# Patient Record
Sex: FEMALE | Race: WHITE
[De-identification: names, ages, dates, MRNs, and addresses within clinical notes are randomized per-mention and may not be internally consistent; named-entity substitution may affect disease eponyms.]

---

## 2022-10-28 ENCOUNTER — HOSPITAL ENCOUNTER (EMERGENCY)
Dept: HOSPITAL 75 - ER FS | Age: 42
Discharge: HOME | End: 2022-10-28
Payer: COMMERCIAL

## 2022-10-28 VITALS — DIASTOLIC BLOOD PRESSURE: 76 MMHG | SYSTOLIC BLOOD PRESSURE: 124 MMHG

## 2022-10-28 VITALS — WEIGHT: 139.99 LBS | HEIGHT: 67.99 IN | BODY MASS INDEX: 21.22 KG/M2

## 2022-10-28 DIAGNOSIS — Z28.310: ICD-10-CM

## 2022-10-28 DIAGNOSIS — R19.7: Primary | ICD-10-CM

## 2022-10-28 LAB
ALBUMIN SERPL-MCNC: 4.6 GM/DL (ref 3.2–4.5)
ALP SERPL-CCNC: 69 U/L (ref 40–136)
ALT SERPL-CCNC: 13 U/L (ref 0–55)
AMYLASE SERPL-CCNC: 51 U/L (ref 25–125)
APTT PPP: YELLOW S
BACTERIA #/AREA URNS HPF: NEGATIVE /HPF
BASOPHILS # BLD AUTO: 0.1 10^3/UL (ref 0–0.1)
BASOPHILS NFR BLD AUTO: 1 % (ref 0–10)
BILIRUB SERPL-MCNC: 0.2 MG/DL (ref 0.1–1)
BILIRUB UR QL STRIP: NEGATIVE
BUN/CREAT SERPL: 21
CALCIUM SERPL-MCNC: 9.1 MG/DL (ref 8.5–10.1)
CHLORIDE SERPL-SCNC: 107 MMOL/L (ref 98–107)
CO2 SERPL-SCNC: 25 MMOL/L (ref 21–32)
CREAT SERPL-MCNC: 0.66 MG/DL (ref 0.6–1.3)
EOSINOPHIL # BLD AUTO: 0.1 10^3/UL (ref 0–0.3)
EOSINOPHIL NFR BLD AUTO: 2 % (ref 0–10)
FIBRINOGEN PPP-MCNC: (no result) MG/DL
GFR SERPLBLD BASED ON 1.73 SQ M-ARVRAT: 112 ML/MIN
GLUCOSE SERPL-MCNC: 101 MG/DL (ref 70–105)
GLUCOSE UR STRIP-MCNC: NEGATIVE MG/DL
HCT VFR BLD CALC: 43 % (ref 35–52)
HGB BLD-MCNC: 14.7 G/DL (ref 11.5–16)
KETONES UR QL STRIP: (no result)
LEUKOCYTE ESTERASE UR QL STRIP: NEGATIVE
LIPASE SERPL-CCNC: 32 U/L (ref 8–78)
LYMPHOCYTES # BLD AUTO: 1 10^3/UL (ref 1–4)
LYMPHOCYTES NFR BLD AUTO: 13 % (ref 12–44)
MANUAL DIFFERENTIAL PERFORMED BLD QL: NO
MCH RBC QN AUTO: 30 PG (ref 25–34)
MCHC RBC AUTO-ENTMCNC: 34 G/DL (ref 32–36)
MCV RBC AUTO: 89 FL (ref 80–99)
MONOCYTES # BLD AUTO: 0.3 10^3/UL (ref 0–1)
MONOCYTES NFR BLD AUTO: 4 % (ref 0–12)
NEUTROPHILS # BLD AUTO: 6 10^3/UL (ref 1.8–7.8)
NEUTROPHILS NFR BLD AUTO: 80 % (ref 42–75)
NITRITE UR QL STRIP: NEGATIVE
PH UR STRIP: 5 [PH] (ref 5–9)
PLATELET # BLD: 379 10^3/UL (ref 130–400)
PMV BLD AUTO: 8.6 FL (ref 9–12.2)
POTASSIUM SERPL-SCNC: 4.5 MMOL/L (ref 3.6–5)
PROT SERPL-MCNC: 7.6 GM/DL (ref 6.4–8.2)
PROT UR QL STRIP: NEGATIVE
RBC #/AREA URNS HPF: (no result) /HPF
SODIUM SERPL-SCNC: 141 MMOL/L (ref 135–145)
SP GR UR STRIP: >=1.03 (ref 1.02–1.02)
SQUAMOUS #/AREA URNS HPF: (no result) /HPF
WBC # BLD AUTO: 7.5 10^3/UL (ref 4.3–11)
WBC #/AREA URNS HPF: (no result) /HPF

## 2022-10-28 PROCEDURE — 81000 URINALYSIS NONAUTO W/SCOPE: CPT

## 2022-10-28 PROCEDURE — 87324 CLOSTRIDIUM AG IA: CPT

## 2022-10-28 PROCEDURE — 83690 ASSAY OF LIPASE: CPT

## 2022-10-28 PROCEDURE — 87015 SPECIMEN INFECT AGNT CONCNTJ: CPT

## 2022-10-28 PROCEDURE — 87449 NOS EACH ORGANISM AG IA: CPT

## 2022-10-28 PROCEDURE — 87046 STOOL CULTR AEROBIC BACT EA: CPT

## 2022-10-28 PROCEDURE — 74177 CT ABD & PELVIS W/CONTRAST: CPT

## 2022-10-28 PROCEDURE — 87899 AGENT NOS ASSAY W/OPTIC: CPT

## 2022-10-28 PROCEDURE — 87328 CRYPTOSPORIDIUM AG IA: CPT

## 2022-10-28 PROCEDURE — 82150 ASSAY OF AMYLASE: CPT

## 2022-10-28 PROCEDURE — 80053 COMPREHEN METABOLIC PANEL: CPT

## 2022-10-28 PROCEDURE — 85025 COMPLETE CBC W/AUTO DIFF WBC: CPT

## 2022-10-28 PROCEDURE — 84703 CHORIONIC GONADOTROPIN ASSAY: CPT

## 2022-10-28 PROCEDURE — 87329 GIARDIA AG IA: CPT

## 2022-10-28 PROCEDURE — 36415 COLL VENOUS BLD VENIPUNCTURE: CPT

## 2022-10-28 PROCEDURE — 87045 FECES CULTURE AEROBIC BACT: CPT

## 2022-10-28 PROCEDURE — 82274 ASSAY TEST FOR BLOOD FECAL: CPT

## 2022-10-28 NOTE — DIAGNOSTIC IMAGING REPORT
PROCEDURE: CT abdomen and pelvis with contrast.



TECHNIQUE: Multiple contiguous axial images were obtained through

the abdomen and pelvis after administration of intravenous

contrast. Auto Exposure Controls were utilized during the CT exam

to meet ALARA standards for radiation dose reduction. All CT

scans use one or more of the following dose optimizing

techniques: automated exposure control, MA and/or KvP adjustment

based on patient size and exam type or iterative reconstruction.



DATE: October 28, 2022.



COMPARISON: None. 



INDICATION: 42-year-old female, sudden onset severe abdominal

pain. Hematochezia.



FINDINGS: 



The visualized portions of the lung bases are clear. There is a

partially imaged right breast implant and potentially also

present on the left. The heart is not enlarged. There is no

pericardial effusion.



The liver is unremarkable in size and contour. There is no

identified liver lesion. The main, right, and left portal veins

are patent. The gallbladder is unremarkable. There is no biliary

ductal dilation.



Unremarkable appearance of the pancreas. The spleen is normal in

size. The adrenal glands are unremarkable.



Unremarkable appearance of the renal parenchyma. The urinary

collecting systems are not distended. There is contrast in the

urinary collecting systems. The urinary bladder is nondistended

and not well evaluated.



There is abnormal wall thickening and mucosal enhancement of the

rectum and sigmoid colon. There is also abnormal mucosal

enhancement and wall thickening of small bowel most notably

present in the lower abdomen and pelvis. The intestinal tract is

not distended. There is no free intraperitoneal air. There is no

identified drainable fluid collection. There is no sizable volume

free pelvic fluid. There is a very small fat-containing umbilical

hernia.



There are atherosclerotic calcifications. There is nonspecific

stranding in the anterior abdominal wall subcutaneous tissues.

There is no identified abnormally enlarged lymph node in the

abdomen or pelvis which specifically meets CT size criteria for

adenopathy.



There is no identified acute bony abnormality. There are advanced

disc degenerative changes at L5-S1 with mild bilateral facet

degenerative changes at L5-S1.



IMPRESSION: 

CT ABDOMEN AND PELVIS.

1. Abnormal wall thickening and mucosal enhancement of the rectum

and sigmoid colon as well as of distal small bowel most

compatible with an infectious or an inflammatory colitis and

enteritis.



 



Dictated by: 



  Dictated on workstation # [id number]

## 2022-10-28 NOTE — ED ABDOMINAL PAIN
General


Chief Complaint:  Abdominal/GI Problems


Stated Complaint:  ABD PAIN; BLOODY STOOL





History of Present Illness


Date Seen by Provider:  Oct 28, 2022


Time Seen by Provider:  08:56


Initial Comments


42-year-old female from out of town here with complaints of abdominal pain and 

bloody diarrhea.  Patient states that that started this morning about 1930. 

They were at a school visiting with her son.  They she started having some 

midline abdominal generalized pain.  Patient did excuse her self and was gone 

for about 15 minutes that she states the she had explosive diarrhea with bright 

red blood.  She would flush the toilet and have it happen again.  No fever 

chills.  Patient does have sensation of being cold.  She would with the pain 

gets bad she would feel some nausea.  No major medical history.  She does have 

some chronic cystitis.





Allergies and Home Medications


Allergies


Coded Allergies:  


     Sulfa (Sulfonamide Antibiotics) (Verified  Allergy, Unknown, 10/28/22)





Patient Home Medication List


Home Medication List Reviewed:  Yes





Review of Systems


Review of Systems


Constitutional:  see HPI





Past Medical-Social-Family Hx


Patient Social History


Tobacco Use?:  No





Physical Exam


Vital Signs





Vital Signs - First Documented








 10/28/22





 08:46


 


Temp 35.9


 


Pulse 78


 


Resp 18


 


B/P (MAP) 149/80 (103)


 


Pulse Ox 100


 


O2 Delivery Room Air





Capillary Refill :


Height/Weight/BMI


Height: '"


Weight: lbs. oz. kg;  BMI


Method:


General Appearance:  mild distress


HEENT:  PERRL/EOMI, pharynx normal


Neck:  supple, normal inspection


Respiratory:  lungs clear, normal breath sounds


Cardiovascular:  regular rate, rhythm, no murmur


Gastrointestinal:  tenderness (epigastric area, left lower quadrant, 

periumbilical pain)


Rectal:  normal exam; No black stool, No blood streaked stool





Progress/Results/Core Measures


Results/Orders


Lab Results





Laboratory Tests








Test


 10/28/22


09:20 Range/Units


 


 


White Blood Count


 7.5 


 4.3-11.0


10^3/uL


 


Red Blood Count


 4.86 


 3.80-5.11


10^6/uL


 


Hemoglobin 14.7  11.5-16.0  g/dL


 


Hematocrit 43  35-52  %


 


Mean Corpuscular Volume 89  80-99  fL


 


Mean Corpuscular Hemoglobin 30  25-34  pg


 


Mean Corpuscular Hemoglobin


Concent 34 


 32-36  g/dL





 


Red Cell Distribution Width 12.5  10.0-14.5  %


 


Platelet Count


 379 


 130-400


10^3/uL


 


Mean Platelet Volume 8.6 L 9.0-12.2  fL


 


Immature Granulocyte % (Auto) 0   %


 


Neutrophils (%) (Auto) 80 H 42-75  %


 


Lymphocytes (%) (Auto) 13  12-44  %


 


Monocytes (%) (Auto) 4  0-12  %


 


Eosinophils (%) (Auto) 2  0-10  %


 


Basophils (%) (Auto) 1  0-10  %


 


Neutrophils # (Auto)


 6.0 


 1.8-7.8


10^3/uL


 


Lymphocytes # (Auto)


 1.0 


 1.0-4.0


10^3/uL


 


Monocytes # (Auto)


 0.3 


 0.0-1.0


10^3/uL


 


Eosinophils # (Auto)


 0.1 


 0.0-0.3


10^3/uL


 


Basophils # (Auto)


 0.1 


 0.0-0.1


10^3/uL


 


Immature Granulocyte # (Auto)


 0.0 


 0.0-0.1


10^3/uL


 


Urine Color YELLOW   


 


Urine Clarity SL CLOUDY   


 


Urine pH 5.0  5-9  


 


Urine Specific Gravity >=1.030  1.016-1.022  


 


Urine Protein NEGATIVE  NEGATIVE  


 


Urine Glucose (UA) NEGATIVE  NEGATIVE  


 


Urine Ketones TRACE H NEGATIVE  


 


Urine Nitrite NEGATIVE  NEGATIVE  


 


Urine Bilirubin NEGATIVE  NEGATIVE  


 


Urine Urobilinogen 0.2  < = 1.0  MG/DL


 


Urine Leukocyte Esterase NEGATIVE  NEGATIVE  


 


Urine RBC (Auto) TRACE-I H NEGATIVE  


 


Urine RBC 0-2   /HPF


 


Urine WBC RARE   /HPF


 


Urine Squamous Epithelial


Cells 5-10 


  /HPF





 


Urine Crystals NONE   /LPF


 


Urine Bacteria NEGATIVE   /HPF


 


Urine Casts NONE   /LPF


 


Urine Mucus MODERATE H  /LPF


 


Urine Culture Indicated NO   


 


Sodium Level 141  135-145  MMOL/L


 


Potassium Level 4.5  3.6-5.0  MMOL/L


 


Chloride Level 107    MMOL/L


 


Carbon Dioxide Level 25  21-32  MMOL/L


 


Anion Gap 9  5-14  MMOL/L


 


Blood Urea Nitrogen 14  7-18  MG/DL


 


Creatinine


 0.66 


 0.60-1.30


MG/DL


 


Estimat Glomerular Filtration


Rate 112 


  





 


BUN/Creatinine Ratio 21   


 


Glucose Level 101    MG/DL


 


Calcium Level 9.1  8.5-10.1  MG/DL


 


Corrected Calcium   8.5-10.1  MG/DL


 


Total Bilirubin 0.2  0.1-1.0  MG/DL


 


Aspartate Amino Transf


(AST/SGOT) 16 


 5-34  U/L





 


Alanine Aminotransferase


(ALT/SGPT) 13 


 0-55  U/L





 


Alkaline Phosphatase 69    U/L


 


Total Protein 7.6  6.4-8.2  GM/DL


 


Albumin 4.6 H 3.2-4.5  GM/DL


 


Amylase Level 51    U/L


 


Lipase 32  8-78  U/L


 


Serum Pregnancy Test,


Qualitative NEGATIVE 


 NEGATIVE  











My Orders





Orders - QUEENIE FUNK MD


Comprehensive Metabolic Panel (10/28/22 09:08)


Lipase (10/28/22 09:08)


Amylase (10/28/22 09:08)


Ua Culture If Indicated (10/28/22 09:08)


Hcg,Qualitative Serum (10/28/22 09:08)


Cbc With Automated Diff (10/28/22 09:08)


Ct Abdomen/Pelvis W (10/28/22 09:08)


C Difficile Ag + Toxin A/B. (10/28/22 09:52)


Isolation Central Supply Req (10/28/22 09:52)


Iohexol Injection (Omnipaque 350 Mg/Ml 1 (10/28/22 10:15)


Received Contrast (Hold Metformin- Contr (10/28/22 10:15)


Sodium Chloride Flush (Catheter Flush Sy (10/28/22 10:15)


Ns (Ivpb) (Sodium Chloride 0.9% Ivpb Bag (10/28/22 10:15)


Ns Iv 1000 Ml (Sodium Chloride 0.9%) (10/28/22 11:15)


Ns Iv 1000 Ml (Sodium Chloride 0.9%) (10/28/22 12:15)





Medications Given in ED





Current Medications








 Medications  Dose


 Ordered  Sig/Jocelynn


 Route  Start Time


 Stop Time Status Last Admin


Dose Admin


 


 Iohexol  75 ml  ONCE  ONCE


 IV  10/28/22 10:15


 10/28/22 10:16 DC 10/28/22 10:34


75 ML


 


 Sodium Chloride  10 ml  AS NEEDED  PRN


 IV  10/28/22 10:15


    10/28/22 10:10


10 ML


 


 Sodium Chloride  100 ml  ONCE  ONCE


 IV  10/28/22 10:15


 10/28/22 10:16 DC 10/28/22 10:34


100 ML








Vital Signs/I&O











 10/28/22





 08:46


 


Temp 35.9


 


Pulse 78


 


Resp 18


 


B/P (MAP) 149/80 (103)


 


Pulse Ox 100


 


O2 Delivery Room Air








CT Results/Progress Notes


NAME:   AXEL WASHINGTON


MED REC#:   [id number]


ACCOUNT#:   [id number]


PT STATUS:   REG ER


:   1980


PHYSICIAN:   QUEENIE FUNK MD


ADMIT DATE:   10/28/22/ER FS


                                   ***Draft***


Date of Exam:10/28/22





CT ABDOMEN/PELVIS W








PROCEDURE: CT abdomen and pelvis with contrast.





TECHNIQUE: Multiple contiguous axial images were obtained through


the abdomen and pelvis after administration of intravenous


contrast. Auto Exposure Controls were utilized during the CT exam


to meet ALARA standards for radiation dose reduction. All CT


scans use one or more of the following dose optimizing


techniques: automated exposure control, MA and/or KvP adjustment


based on patient size and exam type or iterative reconstruction.





DATE: 2022.





COMPARISON: None. 





INDICATION: 42-year-old female, sudden onset severe abdominal


pain. Hematochezia.





FINDINGS: 





The visualized portions of the lung bases are clear. There is a


partially imaged right breast implant and potentially also


present on the left. The heart is not enlarged. There is no


pericardial effusion.





The liver is unremarkable in size and contour. There is no


identified liver lesion. The main, right, and left portal veins


are patent. The gallbladder is unremarkable. There is no biliary


ductal dilation.





Unremarkable appearance of the pancreas. The spleen is normal in


size. The adrenal glands are unremarkable.





Unremarkable appearance of the renal parenchyma. The urinary


collecting systems are not distended. There is contrast in the


urinary collecting systems. The urinary bladder is nondistended


and not well evaluated.





There is abnormal wall thickening and mucosal enhancement of the


rectum and sigmoid colon. There is also abnormal mucosal


enhancement and wall thickening of small bowel most notably


present in the lower abdomen and pelvis. The intestinal tract is


not distended. There is no free intraperitoneal air. There is no


identified drainable fluid collection. There is no sizable volume


free pelvic fluid. There is a very small fat-containing umbilical


hernia.





There are atherosclerotic calcifications. There is nonspecific


stranding in the anterior abdominal wall subcutaneous tissues.


There is no identified abnormally enlarged lymph node in the


abdomen or pelvis which specifically meets CT size criteria for


adenopathy.





There is no identified acute bony abnormality. There are advanced


disc degenerative changes at L5-S1 with mild bilateral facet


degenerative changes at L5-S1.





IMPRESSION: 


CT ABDOMEN AND PELVIS.


1. Abnormal wall thickening and mucosal enhancement of the rectum


and sigmoid colon as well as of distal small bowel most


compatible with an infectious or an inflammatory colitis and


enteritis.





 





  Dictated on workstation # [id number]








Dict:   10/28/22 1050


Trans:   10/28/22 1154


[facility] [id number]





Interpreted by:     CASSIDY GAVIN MD


Electronically signed by:





Departure


Impression





   Primary Impression:  


   Diarrhea


   Qualified Codes:  A09 - Infectious gastroenteritis and colitis, unspecified


Disposition:   HOME, SELF-CARE


Condition:  Stable





Departure-Patient Inst.


Decision time for Depature:  12:06


Referrals:  


NO,LOCAL PHYSICIAN (PCP/Family)


Primary Care Physician


Patient Instructions:  Colitis





Add. Discharge Instructions:  


Take antibiotics as prescribed.  Follow-up with primary care physician when you 

return home.  Return to ER if symptoms worsen





All discharge instructions reviewed with patient and/or family. Voiced 

understanding.


Scripts


Metronidazole (Metronidazole) 500 Mg Tablet


500 MG PO TID for 10 Days, #30 TAB 0 Refills


   Prov: QUEENIE FUNK MD         10/28/22 


Ciprofloxacin HCl (Ciprofloxacin HCl) 500 Mg Tablet


500 MG PO BID for 10 Days, #20 TAB 0 Refills


   Prov: QUEENIE FUNK MD         10/28/22











QUEENIE FUNK MD         Oct 28, 2022 09:08